# Patient Record
Sex: MALE | Race: WHITE | NOT HISPANIC OR LATINO | Employment: OTHER | URBAN - METROPOLITAN AREA
[De-identification: names, ages, dates, MRNs, and addresses within clinical notes are randomized per-mention and may not be internally consistent; named-entity substitution may affect disease eponyms.]

---

## 2017-02-02 ENCOUNTER — HISTORICAL (OUTPATIENT)
Dept: RADIOLOGY | Facility: HOSPITAL | Age: 72
End: 2017-02-02

## 2019-02-07 ENCOUNTER — HISTORICAL (OUTPATIENT)
Dept: RADIOLOGY | Facility: HOSPITAL | Age: 74
End: 2019-02-07

## 2019-02-07 LAB — POC CREATININE: 1.3 MG/DL (ref 0.6–1.3)

## 2021-02-08 ENCOUNTER — HISTORICAL (OUTPATIENT)
Dept: RADIOLOGY | Facility: HOSPITAL | Age: 76
End: 2021-02-08

## 2021-02-08 LAB — POC CREATININE: 1.1 MG/DL (ref 0.6–1.3)

## 2022-11-09 DIAGNOSIS — D32.0 BENIGN NEOPLASM OF CEREBRAL MENINGES: Primary | ICD-10-CM

## 2023-02-14 ENCOUNTER — TELEPHONE (OUTPATIENT)
Dept: NEUROSURGERY | Facility: CLINIC | Age: 78
End: 2023-02-14
Payer: COMMERCIAL

## 2023-02-15 NOTE — TELEPHONE ENCOUNTER
If patient calls back please let him know that he missed his original appt from 02/08 and I r/s him to 03/06 with Dr. Whittington due to his MRI scheduled on 02/24.

## 2023-04-19 ENCOUNTER — OFFICE VISIT (OUTPATIENT)
Dept: NEUROSURGERY | Facility: CLINIC | Age: 78
End: 2023-04-19
Payer: COMMERCIAL

## 2023-04-19 ENCOUNTER — APPOINTMENT (OUTPATIENT)
Dept: RADIOLOGY | Facility: HOSPITAL | Age: 78
End: 2023-04-19
Attending: PHYSICIAN ASSISTANT
Payer: COMMERCIAL

## 2023-04-19 VITALS
HEIGHT: 72 IN | SYSTOLIC BLOOD PRESSURE: 135 MMHG | DIASTOLIC BLOOD PRESSURE: 70 MMHG | HEART RATE: 60 BPM | WEIGHT: 216.38 LBS | BODY MASS INDEX: 29.31 KG/M2 | RESPIRATION RATE: 16 BRPM

## 2023-04-19 DIAGNOSIS — D32.0 BENIGN NEOPLASM OF CEREBRAL MENINGES: Primary | ICD-10-CM

## 2023-04-19 DIAGNOSIS — D32.0 BENIGN NEOPLASM OF CEREBRAL MENINGES: ICD-10-CM

## 2023-04-19 PROCEDURE — 99213 PR OFFICE/OUTPT VISIT, EST, LEVL III, 20-29 MIN: ICD-10-PCS | Mod: ,,, | Performed by: NEUROLOGICAL SURGERY

## 2023-04-19 PROCEDURE — 3288F PR FALLS RISK ASSESSMENT DOCUMENTED: ICD-10-PCS | Mod: CPTII,,, | Performed by: NEUROLOGICAL SURGERY

## 2023-04-19 PROCEDURE — 1159F PR MEDICATION LIST DOCUMENTED IN MEDICAL RECORD: ICD-10-PCS | Mod: CPTII,,, | Performed by: NEUROLOGICAL SURGERY

## 2023-04-19 PROCEDURE — 3288F FALL RISK ASSESSMENT DOCD: CPT | Mod: CPTII,,, | Performed by: NEUROLOGICAL SURGERY

## 2023-04-19 PROCEDURE — 3078F DIAST BP <80 MM HG: CPT | Mod: CPTII,,, | Performed by: NEUROLOGICAL SURGERY

## 2023-04-19 PROCEDURE — 1126F AMNT PAIN NOTED NONE PRSNT: CPT | Mod: CPTII,,, | Performed by: NEUROLOGICAL SURGERY

## 2023-04-19 PROCEDURE — 3075F SYST BP GE 130 - 139MM HG: CPT | Mod: CPTII,,, | Performed by: NEUROLOGICAL SURGERY

## 2023-04-19 PROCEDURE — 25500020 PHARM REV CODE 255: Performed by: PHYSICIAN ASSISTANT

## 2023-04-19 PROCEDURE — 99213 OFFICE O/P EST LOW 20 MIN: CPT | Mod: ,,, | Performed by: NEUROLOGICAL SURGERY

## 2023-04-19 PROCEDURE — 1101F PT FALLS ASSESS-DOCD LE1/YR: CPT | Mod: CPTII,,, | Performed by: NEUROLOGICAL SURGERY

## 2023-04-19 PROCEDURE — 1126F PR PAIN SEVERITY QUANTIFIED, NO PAIN PRESENT: ICD-10-PCS | Mod: CPTII,,, | Performed by: NEUROLOGICAL SURGERY

## 2023-04-19 PROCEDURE — 70553 MRI BRAIN STEM W/O & W/DYE: CPT | Mod: TC

## 2023-04-19 PROCEDURE — 1160F PR REVIEW ALL MEDS BY PRESCRIBER/CLIN PHARMACIST DOCUMENTED: ICD-10-PCS | Mod: CPTII,,, | Performed by: NEUROLOGICAL SURGERY

## 2023-04-19 PROCEDURE — 3075F PR MOST RECENT SYSTOLIC BLOOD PRESS GE 130-139MM HG: ICD-10-PCS | Mod: CPTII,,, | Performed by: NEUROLOGICAL SURGERY

## 2023-04-19 PROCEDURE — A9577 INJ MULTIHANCE: HCPCS | Performed by: PHYSICIAN ASSISTANT

## 2023-04-19 PROCEDURE — 1159F MED LIST DOCD IN RCRD: CPT | Mod: CPTII,,, | Performed by: NEUROLOGICAL SURGERY

## 2023-04-19 PROCEDURE — 3078F PR MOST RECENT DIASTOLIC BLOOD PRESSURE < 80 MM HG: ICD-10-PCS | Mod: CPTII,,, | Performed by: NEUROLOGICAL SURGERY

## 2023-04-19 PROCEDURE — 1101F PR PT FALLS ASSESS DOC 0-1 FALLS W/OUT INJ PAST YR: ICD-10-PCS | Mod: CPTII,,, | Performed by: NEUROLOGICAL SURGERY

## 2023-04-19 PROCEDURE — 1160F RVW MEDS BY RX/DR IN RCRD: CPT | Mod: CPTII,,, | Performed by: NEUROLOGICAL SURGERY

## 2023-04-19 RX ORDER — LOSARTAN POTASSIUM AND HYDROCHLOROTHIAZIDE 12.5; 5 MG/1; MG/1
1 TABLET ORAL
COMMUNITY
Start: 2022-12-11

## 2023-04-19 RX ORDER — EPINEPHRINE 0.22MG
100 AEROSOL WITH ADAPTER (ML) INHALATION DAILY
COMMUNITY

## 2023-04-19 RX ORDER — ESCITALOPRAM OXALATE 10 MG/1
10 TABLET ORAL
COMMUNITY
Start: 2023-02-09

## 2023-04-19 RX ORDER — PIOGLITAZONEHYDROCHLORIDE 15 MG/1
15 TABLET ORAL
COMMUNITY
Start: 2023-01-10

## 2023-04-19 RX ORDER — LOSARTAN POTASSIUM 25 MG/1
25 TABLET ORAL
COMMUNITY
Start: 2023-03-25

## 2023-04-19 RX ORDER — DAPAGLIFLOZIN 10 MG/1
10 TABLET, FILM COATED ORAL
COMMUNITY
Start: 2023-04-17

## 2023-04-19 RX ORDER — OMEGA-3-ACID ETHYL ESTERS 1 G/1
2 CAPSULE, LIQUID FILLED ORAL 2 TIMES DAILY
COMMUNITY

## 2023-04-19 RX ADMIN — GADOBENATE DIMEGLUMINE 20 ML: 529 INJECTION, SOLUTION INTRAVENOUS at 11:04

## 2023-04-19 NOTE — PROGRESS NOTES
[facility]  Neurosurgery  Established Patient      Clint Hernandez   [id number]   1945       SUBJECTIVE:     History of Present Illness:  Patient is a 77 y.o. male who presents for a ten year post op appointment.  He completed Cyberknife treatment of a right frontoparietal meningioma on 10/24/12.  He then underwent right frontoparietal craniotomy for excision of meningioma on 2/5/13.  He is doing well today.  He has decreased his Keppra to 500mg a day.  He has been on this dose for about three years.  He has not had any seizure activity since the decrease in medication.  He denies any new or worsening symptoms.        Past Medical History:   Diagnosis Date    Atrial fibrillation     Benign neoplasm of cerebral meninges     DM (diabetes mellitus)     HLD (hyperlipidemia)     HTN (hypertension)     Seizure disorder       Past Surgical History:   Procedure Laterality Date    CK FOR CEREBRAL MENINGIOMA  10/2012    RIGHT FRONTOPARIETAL CRANIOTOMY FOR TUMOR  02/05/2013    [facility]      Outpatient Encounter Medications as of 4/19/2023   Medication Sig Dispense Refill    coenzyme Q10 (CO Q-10) 100 mg capsule Take 100 mg by mouth once daily.      EScitalopram oxalate (LEXAPRO) 10 MG tablet Take 10 mg by mouth.      FARXIGA 10 mg tablet Take 10 mg by mouth.      fish oil/borage/flax/om3,6,9 1 (OMEGA 3-6-9 ORAL) Take by mouth.      levETIRAcetam (KEPPRA) 500 MG Tab TAKE 1 TABLET BY MOUTH TWICE DAILY 60 tablet prn    losartan (COZAAR) 25 MG tablet Take 25 mg by mouth.      pioglitazone (ACTOS) 15 MG tablet Take 15 mg by mouth.      losartan-hydrochlorothiazide 50-12.5 mg (HYZAAR) 50-12.5 mg per tablet Take 1 tablet by mouth.      omega-3 acid ethyl esters (LOVAZA) 1 gram capsule Take 2 g by mouth 2 (two) times daily.       Facility-Administered Encounter Medications as of 4/19/2023   Medication Dose Route Frequency Provider Last Rate Last Admin    [COMPLETED] gadobenate dimeglumine (MULTIHANCE) injection 20  mL  20 mL Intravenous ONCE PRN Shahrzad Cook PA-C   20 mL at 04/19/23 1105      Review of patient's allergies indicates:  No Known Allergies   Social History     Tobacco Use    Smoking status: Never    Smokeless tobacco: Never   Substance Use Topics    Alcohol use: Not on file      Family History   Problem Relation Age of Onset    Cancer Brother        Review of Systems:    Pertinent items are noted in HPI.      OBJECTIVE:     Vital Signs  Pulse: 60  Resp: 16  BP: 135/70  Pain Score: 0-No pain  Height: 6' (182.9 cm)  Weight: 98.2 kg (216 lb 6.4 oz)  Body mass index is 29.35 kg/m².    General:  healthy, alert, no distress, cooperative    Head:  Normocephalic, without obvious abnormality, atraumatic    Lungs:   Breathing is quiet, non-lablored    Neurological:    Oriented to Person, Place, Time   Speech:  normal  Memory, cognition, and affect are appropriate.  Extraocular movements are intact.  Movements of facial expression are intact and symmetric.  Motor Strength: Moves all extremities spontaneously with good tone.  No abnormal movements seen.    Gait is normal        ASSESSMENT/PLAN:     1. Benign neoplasm of cerebral meninges     - Follow up as needed        I, Dr. Gunnar Whittington, personally performed the services described in this documentation. All medical record entries made by the scribe, Eliza Beltrán RN, were at my direction and in my presence.  I have reviewed the chart and agree that the record reflects my personal performance and is accurate and complete. Gunnar Whittington MD.  1:26 PM 04/19/2023       Gunnar Whittington MD FACS [facility]

## 2023-04-20 LAB
CREAT SERPL-MCNC: 1.1 MG/DL (ref 0.5–1.4)
SAMPLE: NORMAL

## 2023-06-05 NOTE — PROGRESS NOTES
[facility]  Neurosurgery  Established Patient      Clint Hernandez   [id number]   1945       SUBJECTIVE:     History of Present Illness:  Patient is a 77 y.o. male who presents for a ten year post op appointment.  He completed Cyberknife treatment of a right frontoparietal meningioma on 10/24/12.  He then underwent right frontoparietal craniotomy for excision of meningioma on 2/5/13.  He is doing well today.  He has decreased his Keppra to 500mg a day.  He has been on this dose for about three years.  He has not had any seizure activity since the decrease in medication.  He denies any new or worsening symptoms.        Past Medical History:   Diagnosis Date    Atrial fibrillation     Benign neoplasm of cerebral meninges     DM (diabetes mellitus)     HLD (hyperlipidemia)     HTN (hypertension)     Seizure disorder       Past Surgical History:   Procedure Laterality Date    CK FOR CEREBRAL MENINGIOMA  10/2012    RIGHT FRONTOPARIETAL CRANIOTOMY FOR TUMOR  02/05/2013    [facility]      Outpatient Encounter Medications as of 4/19/2023   Medication Sig Dispense Refill    coenzyme Q10 (CO Q-10) 100 mg capsule Take 100 mg by mouth once daily.      EScitalopram oxalate (LEXAPRO) 10 MG tablet Take 10 mg by mouth.      FARXIGA 10 mg tablet Take 10 mg by mouth.      fish oil/borage/flax/om3,6,9 1 (OMEGA 3-6-9 ORAL) Take by mouth.      levETIRAcetam (KEPPRA) 500 MG Tab TAKE 1 TABLET BY MOUTH TWICE DAILY 60 tablet prn    losartan (COZAAR) 25 MG tablet Take 25 mg by mouth.      pioglitazone (ACTOS) 15 MG tablet Take 15 mg by mouth.      losartan-hydrochlorothiazide 50-12.5 mg (HYZAAR) 50-12.5 mg per tablet Take 1 tablet by mouth.      omega-3 acid ethyl esters (LOVAZA) 1 gram capsule Take 2 g by mouth 2 (two) times daily.       Facility-Administered Encounter Medications as of 4/19/2023   Medication Dose Route Frequency Provider Last Rate Last Admin    [COMPLETED] gadobenate dimeglumine (MULTIHANCE) injection 20  mL  20 mL Intravenous ONCE PRN Shahrzad Cook PA-C   20 mL at 04/19/23 1105      Review of patient's allergies indicates:  No Known Allergies   Social History     Tobacco Use    Smoking status: Never    Smokeless tobacco: Never   Substance Use Topics    Alcohol use: Not on file      Family History   Problem Relation Age of Onset    Cancer Brother        Review of Systems:    Pertinent items are noted in HPI.      OBJECTIVE:     Vital Signs  Pulse: 60  Resp: 16  BP: 135/70  Pain Score: 0-No pain  Height: 6' (182.9 cm)  Weight: 98.2 kg (216 lb 6.4 oz)  Body mass index is 29.35 kg/m².    General:  healthy, alert, no distress, cooperative    Head:  Normocephalic, without obvious abnormality, atraumatic    Lungs:   Breathing is quiet, non-lablored    Neurological:    Oriented to Person, Place, Time   Speech:  normal  Memory, cognition, and affect are appropriate.  Extraocular movements are intact.  Movements of facial expression are intact and symmetric.  Motor Strength: Moves all extremities spontaneously with good tone.  No abnormal movements seen.    Gait is normal    10 year post-CK MRI shows no recurrent/residual tumor. No further imaging f/u is necessary.    ASSESSMENT/PLAN:     1. Benign neoplasm of cerebral meninges     - Follow up as needed        I, Dr. Gunnar Whittington, personally performed the services described in this documentation. All medical record entries made by the scribe, Eliza Beltrán RN, were at my direction and in my presence.  I have reviewed the chart and agree that the record reflects my personal performance and is accurate and complete. Gunnar Whittington MD.  1:26 PM 04/19/2023       Gunnar Whittington MD FACS [facility]

## 2023-09-02 DIAGNOSIS — D32.0 BENIGN NEOPLASM OF CEREBRAL MENINGES: Primary | ICD-10-CM

## 2023-09-04 RX ORDER — LEVETIRACETAM 500 MG/1
TABLET ORAL
Qty: 60 TABLET | Status: SHIPPED | OUTPATIENT
Start: 2023-09-04

## 2023-09-05 RX ORDER — LEVETIRACETAM 500 MG/1
TABLET ORAL
Qty: 60 TABLET | OUTPATIENT
Start: 2023-09-05